# Patient Record
Sex: FEMALE | Race: OTHER | NOT HISPANIC OR LATINO | URBAN - METROPOLITAN AREA
[De-identification: names, ages, dates, MRNs, and addresses within clinical notes are randomized per-mention and may not be internally consistent; named-entity substitution may affect disease eponyms.]

---

## 2017-05-25 ENCOUNTER — INPATIENT (INPATIENT)
Facility: HOSPITAL | Age: 47
LOS: 0 days | Discharge: HOME | End: 2017-05-26
Attending: OBSTETRICS & GYNECOLOGY | Admitting: OBSTETRICS & GYNECOLOGY
Payer: MEDICAID

## 2017-05-25 VITALS
DIASTOLIC BLOOD PRESSURE: 88 MMHG | OXYGEN SATURATION: 100 % | SYSTOLIC BLOOD PRESSURE: 146 MMHG | HEART RATE: 131 BPM | TEMPERATURE: 98 F | RESPIRATION RATE: 20 BRPM

## 2017-05-25 DIAGNOSIS — D50.0 IRON DEFICIENCY ANEMIA SECONDARY TO BLOOD LOSS (CHRONIC): ICD-10-CM

## 2017-05-25 DIAGNOSIS — D25.9 LEIOMYOMA OF UTERUS, UNSPECIFIED: ICD-10-CM

## 2017-05-25 LAB
ALBUMIN SERPL ELPH-MCNC: 4.2 G/DL — SIGNIFICANT CHANGE UP (ref 3.3–5)
ALP SERPL-CCNC: 57 U/L — SIGNIFICANT CHANGE UP (ref 40–120)
ALT FLD-CCNC: 5 U/L — SIGNIFICANT CHANGE UP (ref 4–33)
ANISOCYTOSIS BLD QL: SIGNIFICANT CHANGE UP
APTT BLD: 26.2 SEC — LOW (ref 27.5–37.4)
AST SERPL-CCNC: 9 U/L — SIGNIFICANT CHANGE UP (ref 4–32)
BASOPHILS # BLD AUTO: 0.05 K/UL — SIGNIFICANT CHANGE UP (ref 0–0.2)
BASOPHILS NFR BLD AUTO: 0.5 % — SIGNIFICANT CHANGE UP (ref 0–2)
BASOPHILS NFR SPEC: 0 % — SIGNIFICANT CHANGE UP (ref 0–2)
BILIRUB SERPL-MCNC: 0.3 MG/DL — SIGNIFICANT CHANGE UP (ref 0.2–1.2)
BLD GP AB SCN SERPL QL: NEGATIVE — SIGNIFICANT CHANGE UP
BUN SERPL-MCNC: 12 MG/DL — SIGNIFICANT CHANGE UP (ref 7–23)
CALCIUM SERPL-MCNC: 9.1 MG/DL — SIGNIFICANT CHANGE UP (ref 8.4–10.5)
CHLORIDE SERPL-SCNC: 103 MMOL/L — SIGNIFICANT CHANGE UP (ref 98–107)
CO2 SERPL-SCNC: 18 MMOL/L — LOW (ref 22–31)
CREAT SERPL-MCNC: 0.79 MG/DL — SIGNIFICANT CHANGE UP (ref 0.5–1.3)
EOSINOPHIL # BLD AUTO: 0.28 K/UL — SIGNIFICANT CHANGE UP (ref 0–0.5)
EOSINOPHIL NFR BLD AUTO: 3 % — SIGNIFICANT CHANGE UP (ref 0–6)
EOSINOPHIL NFR FLD: 1 % — SIGNIFICANT CHANGE UP (ref 0–6)
GLUCOSE SERPL-MCNC: 132 MG/DL — HIGH (ref 70–99)
HCG SERPL-ACNC: < 5 MIU/ML — SIGNIFICANT CHANGE UP
HCT VFR BLD CALC: 18 % — CRITICAL LOW (ref 34.5–45)
HGB BLD-MCNC: 4.7 G/DL — CRITICAL LOW (ref 11.5–15.5)
HYPOCHROMIA BLD QL: SIGNIFICANT CHANGE UP
IMM GRANULOCYTES NFR BLD AUTO: 0.9 % — SIGNIFICANT CHANGE UP (ref 0–1.5)
INR BLD: 1.22 — HIGH (ref 0.88–1.17)
LYMPHOCYTES # BLD AUTO: 1.49 K/UL — SIGNIFICANT CHANGE UP (ref 1–3.3)
LYMPHOCYTES # BLD AUTO: 15.8 % — SIGNIFICANT CHANGE UP (ref 13–44)
LYMPHOCYTES NFR SPEC AUTO: 26 % — SIGNIFICANT CHANGE UP (ref 13–44)
MACROCYTES BLD QL: SLIGHT — SIGNIFICANT CHANGE UP
MCHC RBC-ENTMCNC: 19 PG — LOW (ref 27–34)
MCHC RBC-ENTMCNC: 26.1 % — LOW (ref 32–36)
MCV RBC AUTO: 72.9 FL — LOW (ref 80–100)
MONOCYTES # BLD AUTO: 0.65 K/UL — SIGNIFICANT CHANGE UP (ref 0–0.9)
MONOCYTES NFR BLD AUTO: 6.9 % — SIGNIFICANT CHANGE UP (ref 2–14)
MONOCYTES NFR BLD: 3 % — SIGNIFICANT CHANGE UP (ref 2–9)
NEUTROPHIL AB SER-ACNC: 70 % — SIGNIFICANT CHANGE UP (ref 43–77)
NEUTROPHILS # BLD AUTO: 6.86 K/UL — SIGNIFICANT CHANGE UP (ref 1.8–7.4)
NEUTROPHILS NFR BLD AUTO: 72.9 % — SIGNIFICANT CHANGE UP (ref 43–77)
PLATELET # BLD AUTO: 243 K/UL — SIGNIFICANT CHANGE UP (ref 150–400)
PLATELET COUNT - ESTIMATE: NORMAL — SIGNIFICANT CHANGE UP
PMV BLD: 10.3 FL — SIGNIFICANT CHANGE UP (ref 7–13)
POIKILOCYTOSIS BLD QL AUTO: SIGNIFICANT CHANGE UP
POLYCHROMASIA BLD QL SMEAR: SIGNIFICANT CHANGE UP
POTASSIUM SERPL-MCNC: 3.6 MMOL/L — SIGNIFICANT CHANGE UP (ref 3.5–5.3)
POTASSIUM SERPL-SCNC: 3.6 MMOL/L — SIGNIFICANT CHANGE UP (ref 3.5–5.3)
PROT SERPL-MCNC: 7.4 G/DL — SIGNIFICANT CHANGE UP (ref 6–8.3)
PROTHROM AB SERPL-ACNC: 13.7 SEC — HIGH (ref 9.8–13.1)
RBC # BLD: 2.47 M/UL — LOW (ref 3.8–5.2)
RBC # FLD: 25.8 % — HIGH (ref 10.3–14.5)
RH IG SCN BLD-IMP: POSITIVE — SIGNIFICANT CHANGE UP
SODIUM SERPL-SCNC: 139 MMOL/L — SIGNIFICANT CHANGE UP (ref 135–145)
TARGETS BLD QL SMEAR: SLIGHT — SIGNIFICANT CHANGE UP
WBC # BLD: 9.41 K/UL — SIGNIFICANT CHANGE UP (ref 3.8–10.5)
WBC # FLD AUTO: 9.41 K/UL — SIGNIFICANT CHANGE UP (ref 3.8–10.5)

## 2017-05-25 PROCEDURE — 76830 TRANSVAGINAL US NON-OB: CPT | Mod: 26

## 2017-05-25 PROCEDURE — 71020: CPT | Mod: 26

## 2017-05-25 RX ORDER — SODIUM CHLORIDE 9 MG/ML
1000 INJECTION INTRAMUSCULAR; INTRAVENOUS; SUBCUTANEOUS ONCE
Qty: 0 | Refills: 0 | Status: COMPLETED | OUTPATIENT
Start: 2017-05-25 | End: 2017-05-25

## 2017-05-25 RX ORDER — DOCUSATE SODIUM 100 MG
100 CAPSULE ORAL THREE TIMES A DAY
Qty: 0 | Refills: 0 | Status: DISCONTINUED | OUTPATIENT
Start: 2017-05-25 | End: 2017-05-25

## 2017-05-25 RX ORDER — NORETHINDRONE 0.35 MG/1
5 TABLET ORAL DAILY
Qty: 0 | Refills: 0 | Status: DISCONTINUED | OUTPATIENT
Start: 2017-05-25 | End: 2017-05-26

## 2017-05-25 RX ORDER — FERROUS SULFATE 325(65) MG
325 TABLET ORAL
Qty: 0 | Refills: 0 | Status: DISCONTINUED | OUTPATIENT
Start: 2017-05-25 | End: 2017-05-26

## 2017-05-25 RX ORDER — ASCORBIC ACID 60 MG
500 TABLET,CHEWABLE ORAL THREE TIMES A DAY
Qty: 0 | Refills: 0 | Status: DISCONTINUED | OUTPATIENT
Start: 2017-05-25 | End: 2017-05-26

## 2017-05-25 RX ORDER — DOCUSATE SODIUM 100 MG
100 CAPSULE ORAL
Qty: 0 | Refills: 0 | Status: DISCONTINUED | OUTPATIENT
Start: 2017-05-25 | End: 2017-05-26

## 2017-05-25 RX ORDER — IBUPROFEN 200 MG
400 TABLET ORAL EVERY 6 HOURS
Qty: 0 | Refills: 0 | Status: DISCONTINUED | OUTPATIENT
Start: 2017-05-25 | End: 2017-05-26

## 2017-05-25 RX ADMIN — SODIUM CHLORIDE 1000 MILLILITER(S): 9 INJECTION INTRAMUSCULAR; INTRAVENOUS; SUBCUTANEOUS at 18:41

## 2017-05-25 NOTE — ED PROVIDER NOTE - OBJECTIVE STATEMENT
45 y/o F PMH iron deficiency anemia 2/2 fibroids c/o heavy vaginal bleeding intermittently since yesterday. Pt states at times she is changing pad every 5 minutes. States she has been having menses since mid April. Endorses lightheadedness, fatigue, palpitations, HOLM, and skin pallor. Denies fever, chills, CP, abdominal pain, N/V/D, dysuria. 45 y/o F PMH iron deficiency anemia 2/2 fibroids, no psh, c/o heavy vaginal bleeding intermittently since yesterday. Pt states at times she is changing pad every 5 minutes. States she has been having menses since mid April. Endorses lightheadedness, fatigue, palpitations, HOLM, and skin pallor. Denies fever, chills, CP, abdominal pain, N/V/D, dysuria. Pt states flory is affiliated with [facility] but cannot remember name.

## 2017-05-25 NOTE — H&P ADULT - ASSESSMENT
46F with severe anemia 2/2 AUB.  differential diagnosis:  1. (most likely) anemia due to AUB (secondary to fibroids or adenomyosis (which previous ultrasounds have demonstrated))  2. rule out malignancy: with large pelvic mass not previously seen in imaging or documented on physical exam in previous admissions, must rule out  3. anemia due to underlying hematologic disorder exacerbated by AUB (if poor response to transfusion, will consider consulting hemoatology)

## 2017-05-25 NOTE — H&P ADULT - PROBLEM SELECTOR PLAN 1
-neuro: pt denies current pain. motrin PRN  -pulm: oxygenation well on RA, encourage oob/amb  -CV: VS q4. hemodynamically stable after 1L bolus in ED.  -GI: reg diet  -: pad count, start aygestin for AUB. monitor voids.  -heme: f/u 4 hr post transfusion CBC. start Fe/VitC/Colace. SCD/oob for DVT ppx    damaris brice [id number]

## 2017-05-25 NOTE — ED PROVIDER NOTE - CARE PLAN
Principal Discharge DX:	Uterine leiomyoma, unspecified location  Secondary Diagnosis:	Anemia, unspecified type

## 2017-05-25 NOTE — H&P ADULT - HISTORY OF PRESENT ILLNESS
46  LMP mid-April with continuous bleeding since then presents with symptomatic anemia. She reports heavier bleeding since last night with dyspnea on exertion, dizziness with walking, and heart racing. She feels chest pain when she coughs. Denies nausea/vomiting. Denies abdominal or pelvic pain (although last night when the bleeding became heavy she first noted some stomach pains). Denies fevers/chills. She has been admitted to [facility] and [facility] twice before with symptomatic anemia due to AUB. She has followed up one time in GYN clinic (2016) but was unable to follow up due to personal issues. She is not currently sexually active and does not use birth control. Last PAP 2016 neg HPV neg cytology. She has never had a mammogram.   In the ED, she was noted to be tachycardic, which resolved after 1L NS bolus. She was found to have Hg of 4.7 and a blood transfusion was started.   POB: 3 , 2 eTOP   PGYN: AUB with MICHELE, fibroids  PMSH: denies  Meds: none  All: NKDA  SH: denies TAD

## 2017-05-25 NOTE — ED ADULT TRIAGE NOTE - CHIEF COMPLAINT QUOTE
Pt a&ox3 c/o heavy vaginal bleeding since last night, reports sob, chest discomfort when coughing, reports headache/dizziness. Pt is pale, skin cold to touch, tachycardic. Pt reports changing pads several times an hour. PMH- fibroids, blood transfusions in the past.

## 2017-05-25 NOTE — H&P ADULT - NSHPLABSRESULTS_GEN_ALL_CORE
Blood type: B Positive  Rubella IgG: RPR:                           4.7<LL>   9.41 >-----------< 243    ( 05-25 @ 16:15 )             18.0<LL>    05-25-17 @ 16:15      139  |  103  |  12  ----------------------------<  132<H>  3.6   |  18<L>  |  0.79        Ca    9.1      25 May 2017 16:15    TPro  7.4  /  Alb  4.2  /  TBili  0.3  /  DBili  x   /  AST  9   /  ALT  5   /  AlkPhos  57  05-25-17 @ 16:15

## 2017-05-25 NOTE — ED ADULT NURSE NOTE - OBJECTIVE STATEMENT
46 years old female presents with complaints of vaginal bleeding, weakness, dizziness that started since yesterday. Denies fever, chills. Patient has past history of blood transfusions and fibroids. On arrival, patient is alert and oriented x 4, pale,  ambulatory, lungs are clear bilaterally, abdomen soft and nontender, no vaginal bleeding noted at this time. 20 gauge saline lock inserted in left AC, blood drawn and sent. Will follow up and monitor.

## 2017-05-25 NOTE — ED PROVIDER NOTE - ATTENDING CONTRIBUTION TO CARE
att45 y/o f with h/o anemia, requiring transfusions secondary to fibroid vaginal bleeding, presents with vaginal bleeding since mid april with worsening yesterday and today. + lightheadedness, sob, smith. No cp. Mild chest discomfort only when she coughs. Exam as above. No pooling of blood. Plan for labs, type, likely transfusion with pale conjunctiva, and obgyn c/s.   I have personally performed a face to face bedside history and physical examination of this patient. I have discussed the history, examination, review of systems, assessment and plan of management with the PA. I have reviewed the electronic medical record and amended it to reflect my history, review of systems, physical exam, assessment and plan.

## 2017-05-25 NOTE — ED PROVIDER NOTE - MEDICAL DECISION MAKING DETAILS
45 y/o F with heavy vaginal bleeding and ssx of anemia  -cbc, cmp, t&s, coags, hcg, ekg, cxr, ivf, likely admit for transfusion

## 2017-05-25 NOTE — H&P ADULT - ATTENDING COMMENTS
Pt examined by me.  AUB with severe anemia.  Moderate VB on presentation. Large boggy uterus 22-24wks sized.  Admit to Gyn for management of anemia with multiple units PRBC and Norethindrone for AUB.  Discussed need for endometrial biopsy and hysterectomy

## 2017-05-25 NOTE — H&P ADULT - NSHPPHYSICALEXAM_GEN_ALL_CORE
gen: NAD AAOx3  card: S1S2 RRR  resp: CTAB  abd: +BS, soft, nontender, mass palpable coming off uterus  pelvic: NEFG. dark blood coming from cervix, approx 5cc in vaginal vault. no CMT, no adnexal tenderness. uterus 24-28 wk size by palpation with large fibroid mass palpable well above umbilicus  ext: NT BLD

## 2017-05-26 VITALS
SYSTOLIC BLOOD PRESSURE: 135 MMHG | HEART RATE: 90 BPM | TEMPERATURE: 98 F | DIASTOLIC BLOOD PRESSURE: 81 MMHG | RESPIRATION RATE: 16 BRPM | OXYGEN SATURATION: 100 %

## 2017-05-26 DIAGNOSIS — N93.9 ABNORMAL UTERINE AND VAGINAL BLEEDING, UNSPECIFIED: ICD-10-CM

## 2017-05-26 LAB
BASOPHILS # BLD AUTO: 0.05 K/UL — SIGNIFICANT CHANGE UP (ref 0–0.2)
BASOPHILS NFR BLD AUTO: 0.7 % — SIGNIFICANT CHANGE UP (ref 0–2)
EOSINOPHIL # BLD AUTO: 0.61 K/UL — HIGH (ref 0–0.5)
EOSINOPHIL NFR BLD AUTO: 8 % — HIGH (ref 0–6)
HCT VFR BLD CALC: 26.6 % — LOW (ref 34.5–45)
HGB BLD-MCNC: 7.7 G/DL — LOW (ref 11.5–15.5)
IMM GRANULOCYTES NFR BLD AUTO: 0.8 % — SIGNIFICANT CHANGE UP (ref 0–1.5)
LYMPHOCYTES # BLD AUTO: 1.29 K/UL — SIGNIFICANT CHANGE UP (ref 1–3.3)
LYMPHOCYTES # BLD AUTO: 16.8 % — SIGNIFICANT CHANGE UP (ref 13–44)
MCHC RBC-ENTMCNC: 23 PG — LOW (ref 27–34)
MCHC RBC-ENTMCNC: 28.9 % — LOW (ref 32–36)
MCV RBC AUTO: 79.4 FL — LOW (ref 80–100)
MONOCYTES # BLD AUTO: 0.57 K/UL — SIGNIFICANT CHANGE UP (ref 0–0.9)
MONOCYTES NFR BLD AUTO: 7.4 % — SIGNIFICANT CHANGE UP (ref 2–14)
NEUTROPHILS # BLD AUTO: 5.08 K/UL — SIGNIFICANT CHANGE UP (ref 1.8–7.4)
NEUTROPHILS NFR BLD AUTO: 66.3 % — SIGNIFICANT CHANGE UP (ref 43–77)
PLATELET # BLD AUTO: 188 K/UL — SIGNIFICANT CHANGE UP (ref 150–400)
PMV BLD: 10.7 FL — SIGNIFICANT CHANGE UP (ref 7–13)
RBC # BLD: 3.35 M/UL — LOW (ref 3.8–5.2)
RBC # FLD: 24.2 % — HIGH (ref 10.3–14.5)
WBC # BLD: 7.66 K/UL — SIGNIFICANT CHANGE UP (ref 3.8–10.5)
WBC # FLD AUTO: 7.66 K/UL — SIGNIFICANT CHANGE UP (ref 3.8–10.5)

## 2017-05-26 RX ORDER — FERROUS SULFATE 325(65) MG
1 TABLET ORAL
Qty: 90 | Refills: 0 | OUTPATIENT
Start: 2017-05-26 | End: 2017-06-25

## 2017-05-26 RX ORDER — ASCORBIC ACID 60 MG
1 TABLET,CHEWABLE ORAL
Qty: 0 | Refills: 0 | COMMUNITY
Start: 2017-05-26

## 2017-05-26 RX ORDER — MEDROXYPROGESTERONE ACETATE 150 MG/ML
150 INJECTION, SUSPENSION, EXTENDED RELEASE INTRAMUSCULAR ONCE
Qty: 0 | Refills: 0 | Status: COMPLETED | OUTPATIENT
Start: 2017-05-26 | End: 2017-05-26

## 2017-05-26 RX ORDER — DOCUSATE SODIUM 100 MG
1 CAPSULE ORAL
Qty: 0 | Refills: 0 | COMMUNITY
Start: 2017-05-26

## 2017-05-26 RX ADMIN — MEDROXYPROGESTERONE ACETATE 150 MILLIGRAM(S): 150 INJECTION, SUSPENSION, EXTENDED RELEASE INTRAMUSCULAR at 12:33

## 2017-05-26 RX ADMIN — Medication 325 MILLIGRAM(S): at 12:33

## 2017-05-26 RX ADMIN — NORETHINDRONE 5 MILLIGRAM(S): 0.35 TABLET ORAL at 02:09

## 2017-05-26 RX ADMIN — Medication 100 MILLIGRAM(S): at 06:15

## 2017-05-26 RX ADMIN — Medication 325 MILLIGRAM(S): at 09:18

## 2017-05-26 RX ADMIN — Medication 500 MILLIGRAM(S): at 09:18

## 2017-05-26 RX ADMIN — Medication 1 TABLET(S): at 12:33

## 2017-05-26 NOTE — DISCHARGE NOTE ADULT - CONDITIONS AT DISCHARGE
vss,denies any distress ,patient verbalize understanding discharge instructions ,iv discontinued minimal to moderate soaked pad

## 2017-05-26 NOTE — PROGRESS NOTE ADULT - ASSESSMENT
46  admitted with symptomatic anemia likely 2/2 AUB from fibroids and/or adenomyosis. symptomatically improved with 3U PRBC. agyestin started to temporize bleeding. pt strongly desires to be discharged. pt counseled on the importance of follow up with GYN clinic in 1-2 weeks as she will likely need surgical management for definitive treatment of her AUB.

## 2017-05-26 NOTE — DISCHARGE NOTE ADULT - HOSPITAL COURSE
Pt admitted to hospital 5/25 with Hg/Hgt 4.7/18.0 secondary to abnormal uterine bleeding (continuous bleeding since April). TVUS showing enlarged uterus (18cm) with heterogeneous myometrium and 21mm endometrium distended with blood products. She was given a dose of aygestin and transfused 3U overnight. On HD#2, she felt much improved and wished to be discharged. She understands the importance of close follow up with [facility] GYN clinic for definitive treatment of AUB. Pt admitted to hospital 5/25 with Hg/Hgt 4.7/18.0 secondary to abnormal uterine bleeding (continuous bleeding since April). TVUS showing enlarged uterus (18cm) with heterogeneous myometrium and 21mm endometrium distended with blood products. She was given a dose of aygestin and transfused 3U overnight. On HD#2, she felt much improved and wished to be discharged. She was given a shot of Depo-Provera prior to discharge. She understands the importance of close follow up with [facility] GYN clinic for definitive treatment of AUB.

## 2017-05-26 NOTE — DISCHARGE NOTE ADULT - PATIENT PORTAL LINK FT
“You can access the Follow[facility] Patient Portal, offered by [facility], by registering with the following website: [email or web address]”

## 2017-05-26 NOTE — DISCHARGE NOTE ADULT - CARE PROVIDER_API CALL
JOAO MARIE   3rd floor of [facility]  [place], [place]  Phone: [phone]  Fax: (   )    -

## 2017-05-26 NOTE — DISCHARGE NOTE ADULT - CARE PLAN
Principal Discharge DX:	Abnormal uterine bleeding  Goal:	return to normal state of health  Instructions for follow-up, activity and diet:	please follow up with the [facility] ACU clinic in 1-2 weeks. call the office [phone] for an appointment. take iron with vitamin c three times daily. Principal Discharge DX:	Abnormal uterine bleeding  Goal:	return to normal state of health  Instructions for follow-up, activity and diet:	please follow up with the [facility] ACU clinic in 1-2 weeks. call the office [phone] for an appointment. take iron with vitamin c three times daily. Principal Discharge DX:	Abnormal uterine bleeding  Goal:	return to normal state of health  Instructions for follow-up, activity and diet:	please follow up with the [facility] ACU clinic in 1-2 weeks. call the office [phone] for an appointment. take iron with vitamin c three times daily.

## 2017-05-26 NOTE — DISCHARGE NOTE ADULT - MEDICATION SUMMARY - MEDICATIONS TO TAKE
I will START or STAY ON the medications listed below when I get home from the hospital:    ferrous sulfate 325 mg (65 mg elemental iron) oral tablet  -- 1 tab(s) by mouth 3 times a day  -- Check with your doctor before becoming pregnant.  Do not chew, break, or crush.  May discolor urine or feces.    -- Indication: For Anemia    docusate sodium 100 mg oral capsule  -- 1 cap(s) by mouth 2 times a day  -- Indication: For constipation    Multiple Vitamins oral tablet  -- 1 tab(s) by mouth once a day  -- Indication: For vitamin    ascorbic acid 500 mg oral tablet  -- 1 tab(s) by mouth 3 times a day  -- Indication: For Anemia take with iron

## 2017-05-26 NOTE — DISCHARGE NOTE ADULT - PROVIDER TOKENS
FREE:[LAST:[JOAO PEDRAZA],PHONE:[phone]],FAX:[(   [phone]    -],ADDRESS:[JOAO   3rd floor of [facility] [place], [place]]]

## 2017-05-26 NOTE — PROGRESS NOTE ADULT - PROBLEM SELECTOR PLAN 1
Neuro: not complaining of pain  CV: Hemodynamically stable  Pulm: Saturating well on room air, encourage oob/amb  GI: c/w regular diet  : . pad counts. voiding spontaneously, monitor I/Os  Heme: f/u 4 hr post transfusion cbc. oob/amb/scd for DVT ppx  Dispo: eval for dc

## 2017-05-26 NOTE — DISCHARGE NOTE ADULT - PLAN OF CARE
return to normal state of health please follow up with the [facility] ACU clinic in 1-2 weeks. call the office [phone] for an appointment. take iron with vitamin c three times daily.

## 2017-05-26 NOTE — DISCHARGE NOTE ADULT - INSTRUCTIONS
call md office or come to ER f having excruciating abdoinal pain ,heavy vaginal bleeding,eat healthy ,keep th follow up appointment

## 2017-06-19 ENCOUNTER — RESULT REVIEW (OUTPATIENT)
Age: 47
End: 2017-06-19

## 2017-06-19 ENCOUNTER — OUTPATIENT (OUTPATIENT)
Dept: OUTPATIENT SERVICES | Facility: HOSPITAL | Age: 47
LOS: 1 days | End: 2017-06-19
Payer: MEDICAID

## 2017-06-19 ENCOUNTER — APPOINTMENT (OUTPATIENT)
Dept: OBGYN | Facility: HOSPITAL | Age: 47
End: 2017-06-19

## 2017-06-19 VITALS
WEIGHT: 148 LBS | HEART RATE: 97 BPM | HEIGHT: 61 IN | BODY MASS INDEX: 27.94 KG/M2 | SYSTOLIC BLOOD PRESSURE: 141 MMHG | DIASTOLIC BLOOD PRESSURE: 93 MMHG

## 2017-06-19 DIAGNOSIS — N93.9 ABNORMAL UTERINE AND VAGINAL BLEEDING, UNSPECIFIED: ICD-10-CM

## 2017-06-19 PROBLEM — D25.9 LEIOMYOMA OF UTERUS, UNSPECIFIED: Chronic | Status: ACTIVE | Noted: 2017-05-25

## 2017-06-19 PROBLEM — D50.9 IRON DEFICIENCY ANEMIA, UNSPECIFIED: Chronic | Status: ACTIVE | Noted: 2017-05-25

## 2017-06-19 PROCEDURE — 88305 TISSUE EXAM BY PATHOLOGIST: CPT | Mod: 26

## 2017-06-20 DIAGNOSIS — D25.9 LEIOMYOMA OF UTERUS, UNSPECIFIED: ICD-10-CM

## 2017-06-20 DIAGNOSIS — D50.9 IRON DEFICIENCY ANEMIA, UNSPECIFIED: ICD-10-CM

## 2017-07-07 ENCOUNTER — APPOINTMENT (OUTPATIENT)
Dept: GYNECOLOGIC ONCOLOGY | Facility: HOSPITAL | Age: 47
End: 2017-07-07

## 2017-07-21 ENCOUNTER — APPOINTMENT (OUTPATIENT)
Dept: GYNECOLOGIC ONCOLOGY | Facility: HOSPITAL | Age: 47
End: 2017-07-21

## 2018-08-01 ENCOUNTER — OUTPATIENT (OUTPATIENT)
Dept: OUTPATIENT SERVICES | Facility: HOSPITAL | Age: 48
LOS: 1 days | End: 2018-08-01
Payer: COMMERCIAL

## 2018-08-01 PROCEDURE — G9001: CPT

## 2018-08-17 DIAGNOSIS — Z71.89 OTHER SPECIFIED COUNSELING: ICD-10-CM

## 2022-05-07 NOTE — ED PROVIDER NOTE - PROGRESS NOTE DETAILS
(0) Normal (0) Normal Paged OBGYN at 17:05, 2nd page at 17:22 SAUMYA consulted, awaiting recs, paged twice in the last 30 minutes to Dr Penny's phone [phone] OBGYN consulted, awaiting recs, paged twice in the last 30 minutes to Dr Penny's phone [phone], will page a third time Rojas [place], PGY1: Discussed CDU vs admission with obgyn consult and agreed to admission Rojas Hill, PGY1: Discussed CDU vs admission with obgyn consult and agreed to admission  CARLA Penny: Discussed with CDU and they recommend admission.

## 2023-05-15 NOTE — PATIENT PROFILE ADULT. - ASSIST WITH
PT IN SVT. MD NOTIFIED. EKG AT BEDSIDE. CRASH CART AT BEDSIDE. PT ON CONTINUOUS CARDIAC MONITORING, PULSE OX. PT ALERT. PT SHORT OF BREATH   standing

## 2025-07-03 NOTE — H&P ADULT - NSHPRISKHIVSCREEN_GEN_ALL_CORE
Procedure completed by Dr Liu. Pt tolerated without issues, VSS. Education provided to pt prior to and throughout procedure, questions answered as offered. Catheter sutured, chlorhexidine dressing to site. Transported back to ED for recovery, bedside report given.   Not applicable (known HIV negative status in last year)